# Patient Record
Sex: MALE | Race: WHITE | NOT HISPANIC OR LATINO | URBAN - METROPOLITAN AREA
[De-identification: names, ages, dates, MRNs, and addresses within clinical notes are randomized per-mention and may not be internally consistent; named-entity substitution may affect disease eponyms.]

---

## 2024-01-03 ENCOUNTER — APPOINTMENT (RX ONLY)
Dept: URBAN - METROPOLITAN AREA CLINIC 161 | Facility: CLINIC | Age: 39
Setting detail: DERMATOLOGY
End: 2024-01-03

## 2024-01-03 DIAGNOSIS — L66.2 FOLLICULITIS DECALVANS: ICD-10-CM

## 2024-01-03 DIAGNOSIS — L21.8 OTHER SEBORRHEIC DERMATITIS: ICD-10-CM

## 2024-01-03 PROCEDURE — ? COUNSELING

## 2024-01-03 PROCEDURE — ? PRESCRIPTION

## 2024-01-03 PROCEDURE — ? INTRALESIONAL KENALOG

## 2024-01-03 PROCEDURE — ? PRESCRIPTION MEDICATION MANAGEMENT

## 2024-01-03 PROCEDURE — 99204 OFFICE O/P NEW MOD 45 MIN: CPT | Mod: 25

## 2024-01-03 PROCEDURE — 11900 INJECT SKIN LESIONS </W 7: CPT

## 2024-01-03 RX ORDER — KETOCONAZOLE 20 MG/G
CREAM TOPICAL BID
Qty: 30 | Refills: 3 | Status: ERX | COMMUNITY
Start: 2024-01-03

## 2024-01-03 RX ORDER — FLUOCINOLONE ACETONIDE 0.11 MG/ML
OIL TOPICAL
Qty: 118.28 | Refills: 1 | Status: ERX | COMMUNITY
Start: 2024-01-03

## 2024-01-03 RX ADMIN — FLUOCINOLONE ACETONIDE: 0.11 OIL TOPICAL at 00:00

## 2024-01-03 RX ADMIN — KETOCONAZOLE: 20 CREAM TOPICAL at 00:00

## 2024-01-03 ASSESSMENT — LOCATION DETAILED DESCRIPTION DERM
LOCATION DETAILED: INFERIOR MID FOREHEAD
LOCATION DETAILED: POSTERIOR MID-PARIETAL SCALP
LOCATION DETAILED: LEFT SUPERIOR OCCIPITAL SCALP

## 2024-01-03 ASSESSMENT — LOCATION SIMPLE DESCRIPTION DERM
LOCATION SIMPLE: INFERIOR FOREHEAD
LOCATION SIMPLE: POSTERIOR SCALP

## 2024-01-03 ASSESSMENT — LOCATION ZONE DERM
LOCATION ZONE: FACE
LOCATION ZONE: SCALP

## 2024-01-03 NOTE — PROCEDURE: INTRALESIONAL KENALOG
Administered By (Optional): dr. Santos
How Many Mls Were Removed From The 80 Mg/Ml (5ml) Vial When Preparing The Injectable Solution?: 0
Detail Level: Detailed
Consent: The risks of atrophy were reviewed with the patient.
Total Volume (Ccs): 0.2
Concentration Of Kenalog Solution Injected (Mg/Ml): 10.0
Require Ndc Code?: No
Kenalog Type Of Vial: Multiple Dose
Show Inventory Tab: Hide
Medical Necessity Clause: This procedure was medically necessary because the lesions that were treated were:
Ndc# For Kenalog Only: 6984-6026-66
Kenalog Preparation: Kenalog
Validate Note Data When Using Inventory: Yes

## 2024-01-03 NOTE — HPI: RASH ([email or web address])
Is This A New Presentation, Or A Follow-Up?: Dandruff
Additional History: \\n- Dandruff on scalp and face\\n- Pt has had lesions (begins as pimples) on scalp x 15 years. Seen multiple derm, no definitive diagnosis. Treated with oral abx, full course of accutane and topicals

## 2024-01-03 NOTE — PROCEDURE: PRESCRIPTION MEDICATION MANAGEMENT
Detail Level: Zone
Initiate Treatment: Ketoconazole cream \\nFluocinolone oi
Render In Strict Bullet Format?: No

## 2024-01-03 NOTE — PROCEDURE: COUNSELING
Patient Specific Counseling (Will Not Stick From Patient To Patient): =============1/3/24 \\nExam: scattered papules/pustules, on posterior scalp, extensive scarring \\n- Pt c/o pimple like lesions on occipital/posterior scalp that evolve into draining lesions x 15 years. Pt states lesions drain, with only mild improvement secondary to any/all previous treatments\\n-ddx folliculitis decalvans vs dissecting cellulitis\\n- pt has treated with oral abx (possibly doxy), full course of accutane (5-6 years ago), topicals, ILK\\n-Discussed oral abx vs ILK, if some spots are bothersome\\n- discussed Clindamycin and Rifampin, reviewed R/B/SE of each, to include GI complications (C diff)\\n- discussed watching vs initiating treatment now w/ Abx\\n-Pt elected to defer abx, wants ILK; overall, his condition, though active now, is better than it has been years past; our agreed plan is to try ILK, possibly increasing the dose over time (10mg/cc today), and observe for the natural evolution of it, considering clindamycin/rifampin or other options (maybe dapsone) in future
Detail Level: Detailed
Detail Level: Zone

## 2024-01-11 ENCOUNTER — APPOINTMENT (RX ONLY)
Dept: URBAN - METROPOLITAN AREA CLINIC 161 | Facility: CLINIC | Age: 39
Setting detail: DERMATOLOGY
End: 2024-01-11

## 2024-01-11 DIAGNOSIS — L21.8 OTHER SEBORRHEIC DERMATITIS: ICD-10-CM

## 2024-01-11 PROCEDURE — ? PRESCRIPTION

## 2024-01-11 RX ORDER — KETOCONAZOLE 20 MG/ML
SHAMPOO, SUSPENSION TOPICAL
Qty: 120 | Refills: 4 | Status: ERX | COMMUNITY
Start: 2024-01-11

## 2024-01-11 RX ADMIN — KETOCONAZOLE: 20 SHAMPOO, SUSPENSION TOPICAL at 00:00

## 2024-03-06 ENCOUNTER — RX ONLY (OUTPATIENT)
Age: 39
Setting detail: RX ONLY
End: 2024-03-06

## 2024-03-06 ENCOUNTER — APPOINTMENT (RX ONLY)
Dept: URBAN - METROPOLITAN AREA CLINIC 161 | Facility: CLINIC | Age: 39
Setting detail: DERMATOLOGY
End: 2024-03-06

## 2024-03-06 DIAGNOSIS — L21.8 OTHER SEBORRHEIC DERMATITIS: ICD-10-CM

## 2024-03-06 DIAGNOSIS — L66.2 FOLLICULITIS DECALVANS: ICD-10-CM

## 2024-03-06 PROCEDURE — ? COUNSELING

## 2024-03-06 PROCEDURE — 99214 OFFICE O/P EST MOD 30 MIN: CPT

## 2024-03-06 PROCEDURE — ? PRESCRIPTION MEDICATION MANAGEMENT

## 2024-03-06 PROCEDURE — ? OTC TREATMENT REGIMEN

## 2024-03-06 PROCEDURE — ? PRESCRIPTION

## 2024-03-06 RX ORDER — ROFLUMILAST 3 MG/G
AEROSOL, FOAM TOPICAL
Qty: 60 | Refills: 3 | Status: CANCELLED

## 2024-03-06 RX ORDER — ROFLUMILAST 3 MG/G
AEROSOL, FOAM TOPICAL
Qty: 60 | Refills: 4 | Status: CANCELLED | COMMUNITY
Start: 2024-03-06

## 2024-03-06 RX ORDER — ROFLUMILAST 3 MG/G
AEROSOL, FOAM TOPICAL
Qty: 60 | Refills: 4 | Status: ERX | COMMUNITY
Start: 2024-03-06

## 2024-03-06 NOTE — HPI: RASH
Is This A New Presentation, Or A Follow-Up?: Follow Up Rash
Additional History: - Pt has been treating dry, itchy scalp, face and ears with keto shampoo every other day, alternating with Head and shoulders. applies keto cream daily to face. Treats ears with fluocinolone oil. Pt states mild improvement to ears, but no improvement to flaking and dryness of scalp amd face. ILK on scalp last OV

## 2024-03-06 NOTE — PROCEDURE: COUNSELING
Patient Specific Counseling (Will Not Stick From Patient To Patient): ================ 3/6/24 \\nScaling and erythema on the beard area and scalp throughout\\nF/U Seb derm - chronic itching on parietal scalp and beard area\\n- Pt has been treating dry, itchy scalp, face and ears with keto shampoo and fluocinolone every other day, alternating with Head and shoulders. applies keto cream daily to face. Treats ears with fluocinolone oil. Pt states mild improvement to ears, but no improvement to flaking and dryness of scalp amd face. ILK on scalp last OV\\n- Pt denies cervical injuries or nerve related hx, as it may relate to nerve induced itching. However, pt consistently treated by a chiropractor for lower back pain\\n- If Zoryve is covered, taper back on use of Ketoconazole
Detail Level: Detailed
Patient Specific Counseling (Will Not Stick From Patient To Patient): ================ 3/6/24 \\n- pt has no complaints, improvement to pustular component of scalp\\n=============1/3/24 \\nExam: scattered papules/pustules, on posterior scalp, extensive scarring \\n-stable/improved from prior; he would like to only monitor this issue for now, no new treatment\\n- Pt c/o pimple like lesions on occipital/posterior scalp that evolve into draining lesions x 15 years. Pt states lesions drain, with only mild improvement secondary to any/all previous treatments\\n-ddx folliculitis decalvans vs dissecting cellulitis\\n- pt has treated with oral abx (possibly doxy), full course of accutane (5-6 years ago), topicals, ILK\\n-Discussed oral abx vs ILK, if some spots are bothersome\\n- discussed Clindamycin and Rifampin, reviewed R/B/SE of each, to include GI complications (C diff)\\n- discussed watching vs initiating treatment now w/ Abx\\n-Pt elected to defer abx, wants ILK; overall, his condition, though active now, is better than it has been years past; our agreed plan is to try ILK, possibly increasing the dose over time (10mg/cc today), and observe for the natural evolution of it, considering clindamycin/rifampin or other options (maybe dapsone) in future

## 2024-03-06 NOTE — PROCEDURE: PRESCRIPTION MEDICATION MANAGEMENT
Detail Level: Zone
Render In Strict Bullet Format?: No
Initiate Treatment: Zoryve foam - scalp and face